# Patient Record
Sex: MALE | Race: WHITE | Employment: FULL TIME | URBAN - NONMETROPOLITAN AREA
[De-identification: names, ages, dates, MRNs, and addresses within clinical notes are randomized per-mention and may not be internally consistent; named-entity substitution may affect disease eponyms.]

---

## 2023-10-31 ENCOUNTER — OFFICE VISIT (OUTPATIENT)
Dept: FAMILY MEDICINE CLINIC | Age: 33
End: 2023-10-31
Payer: COMMERCIAL

## 2023-10-31 VITALS
OXYGEN SATURATION: 100 % | WEIGHT: 187 LBS | HEART RATE: 81 BPM | SYSTOLIC BLOOD PRESSURE: 130 MMHG | BODY MASS INDEX: 26.77 KG/M2 | DIASTOLIC BLOOD PRESSURE: 84 MMHG | HEIGHT: 70 IN

## 2023-10-31 DIAGNOSIS — Z00.00 ENCOUNTER FOR WELLNESS EXAMINATION IN ADULT: Primary | ICD-10-CM

## 2023-10-31 PROCEDURE — 99385 PREV VISIT NEW AGE 18-39: CPT | Performed by: NURSE PRACTITIONER

## 2023-10-31 SDOH — ECONOMIC STABILITY: HOUSING INSECURITY
IN THE LAST 12 MONTHS, WAS THERE A TIME WHEN YOU DID NOT HAVE A STEADY PLACE TO SLEEP OR SLEPT IN A SHELTER (INCLUDING NOW)?: NO

## 2023-10-31 SDOH — ECONOMIC STABILITY: INCOME INSECURITY: HOW HARD IS IT FOR YOU TO PAY FOR THE VERY BASICS LIKE FOOD, HOUSING, MEDICAL CARE, AND HEATING?: NOT HARD AT ALL

## 2023-10-31 SDOH — HEALTH STABILITY: PHYSICAL HEALTH: ON AVERAGE, HOW MANY DAYS PER WEEK DO YOU ENGAGE IN MODERATE TO STRENUOUS EXERCISE (LIKE A BRISK WALK)?: 3 DAYS

## 2023-10-31 SDOH — ECONOMIC STABILITY: FOOD INSECURITY: WITHIN THE PAST 12 MONTHS, YOU WORRIED THAT YOUR FOOD WOULD RUN OUT BEFORE YOU GOT MONEY TO BUY MORE.: NEVER TRUE

## 2023-10-31 SDOH — ECONOMIC STABILITY: FOOD INSECURITY: WITHIN THE PAST 12 MONTHS, THE FOOD YOU BOUGHT JUST DIDN'T LAST AND YOU DIDN'T HAVE MONEY TO GET MORE.: NEVER TRUE

## 2023-10-31 SDOH — HEALTH STABILITY: PHYSICAL HEALTH: ON AVERAGE, HOW MANY MINUTES DO YOU ENGAGE IN EXERCISE AT THIS LEVEL?: 30 MIN

## 2023-10-31 ASSESSMENT — PATIENT HEALTH QUESTIONNAIRE - PHQ9
SUM OF ALL RESPONSES TO PHQ QUESTIONS 1-9: 0
2. FEELING DOWN, DEPRESSED OR HOPELESS: 0
SUM OF ALL RESPONSES TO PHQ QUESTIONS 1-9: 0
SUM OF ALL RESPONSES TO PHQ QUESTIONS 1-9: 0
1. LITTLE INTEREST OR PLEASURE IN DOING THINGS: 0
SUM OF ALL RESPONSES TO PHQ QUESTIONS 1-9: 0
SUM OF ALL RESPONSES TO PHQ9 QUESTIONS 1 & 2: 0

## 2023-10-31 NOTE — PROGRESS NOTES
Subjective  Ava Wheeler, 35 y.o. male presents today with:  Chief Complaint   Patient presents with    Establish Care     Following from [facility] was seeing Dr. Bing Gibson. No concerns        I reviewed staff HPI/chief complaint and do agree with above    Patient presents today in office to establish care    History: Hair loss, patient currently taking finasteride and minoxidil for condition. Any previous diagnosis: Denies any chronic medical past history  History of seeing any specialist(s): Denies  When was your last doctor visit: Approximately 1 year ago  Last provider: Dr. Bing Gibson  Any recent labs: no    Health Maintenence UTD:  Colonoscopy: Denies ever having completed, denies any family history of colorectal cancer  PSA: Denies having completed, states previous PCP did want to start laboratory work around age 28 due to family history of prostate cancer    Acute:   Current issues/complaints: Denies any acute symptoms or concerns in office today          Review of Systems   Constitutional:  Negative for chills, fatigue and fever. HENT:  Negative for congestion, ear pain, postnasal drip, rhinorrhea, sinus pressure, sinus pain, sore throat and trouble swallowing. Eyes:  Negative for photophobia, pain, redness and visual disturbance. Respiratory:  Negative for cough, chest tightness and shortness of breath. Cardiovascular:  Negative for chest pain, palpitations and leg swelling. Gastrointestinal:  Negative for abdominal pain, blood in stool, constipation, diarrhea, nausea and vomiting. Endocrine: Negative for cold intolerance and heat intolerance. Genitourinary:  Negative for difficulty urinating, dysuria, frequency, hematuria and urgency. Musculoskeletal:  Negative for arthralgias, joint swelling and myalgias. Skin:  Negative for rash and wound. Neurological:  Negative for dizziness, weakness, light-headedness, numbness and headaches.    Psychiatric/Behavioral:  Negative for behavioral problems

## 2023-11-05 ASSESSMENT — ENCOUNTER SYMPTOMS
BLOOD IN STOOL: 0
EYE PAIN: 0
SINUS PRESSURE: 0
COUGH: 0
NAUSEA: 0
SINUS PAIN: 0
SORE THROAT: 0
SHORTNESS OF BREATH: 0
ABDOMINAL PAIN: 0
TROUBLE SWALLOWING: 0
VOMITING: 0
PHOTOPHOBIA: 0
DIARRHEA: 0
RHINORRHEA: 0
CONSTIPATION: 0
CHEST TIGHTNESS: 0
EYE REDNESS: 0

## 2024-07-25 ENCOUNTER — OFFICE VISIT (OUTPATIENT)
Dept: FAMILY MEDICINE CLINIC | Age: 34
End: 2024-07-25
Payer: COMMERCIAL

## 2024-07-25 VITALS
HEIGHT: 70 IN | HEART RATE: 81 BPM | OXYGEN SATURATION: 98 % | BODY MASS INDEX: 24.62 KG/M2 | WEIGHT: 172 LBS | DIASTOLIC BLOOD PRESSURE: 86 MMHG | SYSTOLIC BLOOD PRESSURE: 118 MMHG

## 2024-07-25 DIAGNOSIS — M54.32 SCIATICA OF LEFT SIDE: Primary | ICD-10-CM

## 2024-07-25 PROCEDURE — 99213 OFFICE O/P EST LOW 20 MIN: CPT | Performed by: NURSE PRACTITIONER

## 2024-07-25 RX ORDER — TIZANIDINE 4 MG/1
4 TABLET ORAL 3 TIMES DAILY PRN
Qty: 30 TABLET | Refills: 0 | Status: SHIPPED | OUTPATIENT
Start: 2024-07-25

## 2024-07-25 RX ORDER — METHYLPREDNISOLONE 4 MG/1
TABLET ORAL
Qty: 1 KIT | Refills: 0 | Status: SHIPPED | OUTPATIENT
Start: 2024-07-25

## 2024-07-25 ASSESSMENT — PATIENT HEALTH QUESTIONNAIRE - PHQ9
SUM OF ALL RESPONSES TO PHQ9 QUESTIONS 1 & 2: 0
SUM OF ALL RESPONSES TO PHQ QUESTIONS 1-9: 0
SUM OF ALL RESPONSES TO PHQ QUESTIONS 1-9: 0
1. LITTLE INTEREST OR PLEASURE IN DOING THINGS: NOT AT ALL
SUM OF ALL RESPONSES TO PHQ QUESTIONS 1-9: 0
SUM OF ALL RESPONSES TO PHQ QUESTIONS 1-9: 0

## 2024-07-25 NOTE — PROGRESS NOTES
Date of Visit:  2024  Patient Name: Johnnie Hurst   Patient :  1990     CHIEF COMPLAINT:     Johnnie Hurst is a 33 y.o. male who presents today for an general visit to be evaluated for the following condition(s):  Chief Complaint   Patient presents with    Back Pain     Symptoms began- 2 weeks ago   Injury? N  Fall? N   Location- LT leg   Rates- 8/10 when walking   History of back issues- N  Numbness/ tingling- Y   Weakness-N   Loss bowel/bladder- N   Treatments- Ibuprofen        HISTORY OF PRESENT ILLNESS     I reviewed staff HPI/chief complaint and do agree with above    CHIEF COMPLAINT: Lower back pain which has not improved, but is having pain in the Left buttock region radiating down the left posterior leg.     PRECIPITATING EVENT: Denies any recent injuries or trauma to the area    DURATION OF SYMPTOMS: X 2 weeks    Pain Radiation: Down the posterior left leg region    Aggravating Factors: Certain positions with lumbar flexion/extension Lifting, Pushing, Pulling, Walking, Walking upstairs, Walking downstairs, Entering/exiting a car    Alleviating Factors: Again he can get in certain positions to improve the pain, has been using ibuprofen which did help with the original lumbar back pain    Pain Ratio: Pain today noted mostly to the left buttocks radiating down the left posterior leg    AMBULATORY STATUS: Independent Community Distances  ANTIPLATELET OR ANTICOAGULATION STATUS: No  PREVIOUS IMAGING: No previous imaging  PREVIOUS PHYSICAL THERAPY/CHIROPRACTOR: No physical therapy or chiropractic services, states he does have a friend who is a physical therapist and has been doing lumbar back stretches that were discussed with this individual which has helped with the lower back pain.  PREVIOUS CONSERVATIVE TREATMENTS: Ibuprofen and stretching  RED FLAGS: Denies any loss of bowel or bladder, saddle paresthesia, unintentional weight loss, fevers, night sweats/chills, changes in appetite, changes

## 2024-07-26 ENCOUNTER — TELEPHONE (OUTPATIENT)
Dept: FAMILY MEDICINE CLINIC | Age: 34
End: 2024-07-26

## 2024-07-26 NOTE — TELEPHONE ENCOUNTER
Called patient and discussed symptoms and continue treatment plan.  Please notify provider if any worsening symptoms noted.

## 2024-07-26 NOTE — TELEPHONE ENCOUNTER
Johnnie woke up today in much more pain than normal. He can still find a comfortable position, but is unable to walk upright and visibly more uncomfortable. We are trusting the process but we were wondering if this is normal and a “worse before getting better” situation. I know you probably can’t respond to me and we have been trying to log in to Johnnie’s My Chart with no luck. If someone could call Johnnie at [phone] if necessary, that would be so helpful!

## 2024-07-30 ASSESSMENT — ENCOUNTER SYMPTOMS
ABDOMINAL PAIN: 0
BACK PAIN: 1
CONSTIPATION: 0
VOMITING: 0
CHEST TIGHTNESS: 0
COLOR CHANGE: 0
SHORTNESS OF BREATH: 0
NAUSEA: 0
DIARRHEA: 0

## 2024-07-31 DIAGNOSIS — M54.30 SCIATICA, UNSPECIFIED LATERALITY: Primary | ICD-10-CM

## 2024-07-31 RX ORDER — GABAPENTIN 300 MG/1
300 CAPSULE ORAL 2 TIMES DAILY
Qty: 30 CAPSULE | Refills: 0 | Status: SHIPPED | OUTPATIENT
Start: 2024-07-31 | End: 2024-08-15

## 2024-08-01 ENCOUNTER — TELEPHONE (OUTPATIENT)
Dept: FAMILY MEDICINE CLINIC | Age: 34
End: 2024-08-01

## 2024-08-01 ENCOUNTER — HOSPITAL ENCOUNTER (OUTPATIENT)
Dept: MRI IMAGING | Age: 34
Discharge: HOME | End: 2024-08-03
Payer: COMMERCIAL

## 2024-08-01 DIAGNOSIS — M54.42 MIDLINE LOW BACK PAIN WITH LEFT-SIDED SCIATICA, UNSPECIFIED CHRONICITY: ICD-10-CM

## 2024-08-01 DIAGNOSIS — R29.898 LEFT LEG WEAKNESS: ICD-10-CM

## 2024-08-01 DIAGNOSIS — R29.898 LEFT LEG WEAKNESS: Primary | ICD-10-CM

## 2024-08-01 PROCEDURE — 72148 MRI LUMBAR SPINE W/O DYE: CPT

## 2024-08-02 ENCOUNTER — OFFICE VISIT (OUTPATIENT)
Dept: FAMILY MEDICINE CLINIC | Age: 34
End: 2024-08-02
Payer: COMMERCIAL

## 2024-08-02 VITALS
OXYGEN SATURATION: 98 % | TEMPERATURE: 98 F | HEIGHT: 70 IN | BODY MASS INDEX: 24.57 KG/M2 | SYSTOLIC BLOOD PRESSURE: 134 MMHG | HEART RATE: 97 BPM | DIASTOLIC BLOOD PRESSURE: 88 MMHG | WEIGHT: 171.6 LBS

## 2024-08-02 DIAGNOSIS — M51.37 DEGENERATIVE DISC DISEASE AT L5-S1 LEVEL: ICD-10-CM

## 2024-08-02 DIAGNOSIS — M48.062 SPINAL STENOSIS OF LUMBAR REGION WITH NEUROGENIC CLAUDICATION: Primary | ICD-10-CM

## 2024-08-02 DIAGNOSIS — M51.26 LUMBAR DISC HERNIATION: ICD-10-CM

## 2024-08-02 PROCEDURE — 99213 OFFICE O/P EST LOW 20 MIN: CPT | Performed by: NURSE PRACTITIONER

## 2024-08-02 RX ORDER — CYCLOBENZAPRINE HCL 10 MG
10 TABLET ORAL 3 TIMES DAILY PRN
COMMUNITY
Start: 2024-07-29

## 2024-08-02 ASSESSMENT — ENCOUNTER SYMPTOMS
NAUSEA: 0
COLOR CHANGE: 0
SHORTNESS OF BREATH: 0
DIARRHEA: 0
CONSTIPATION: 0
VOMITING: 0
CHEST TIGHTNESS: 0
ABDOMINAL PAIN: 0
BACK PAIN: 1

## 2024-08-02 NOTE — PROGRESS NOTES
Date of Visit:  2024  Patient Name: Johnnie Hurst   Patient :  1990     CHIEF COMPLAINT:     Johnnie Hurst is a 33 y.o. male who presents today for an general visit to be evaluated for the following condition(s):  Chief Complaint   Patient presents with    Back Pain     PT was seen at [facility] on 2024. PT had MRI completed and would like to go over results.        HISTORY OF PRESENT ILLNESS     I reviewed staff HPI/chief complaint and do agree with above    Patient presents for continued lower back pain/sciatica on left side, was seen on 2024 for this concern and was started on a course of prednisone and tizanidine he did return to work the next day and the symptoms did significantly worsen with a cramping type pain in the back and severe pain rating down his left leg.  Due to this he presented to the ED at [facility] on 2024 and did have x-ray completed that showed degenerative disc changes in the lower lumbar/sacral spine and was given a IM course of Toradol, and a prescription for Norco however was not effective for symptoms therefore did have gabapentin sent to pharmacy and [place] was stopped, also proceeded with an MRI at that time due to significant worsening in symptoms.  The MRI results are attached below, patient states that his pain is somewhat bearable on the gabapentin although does continue to have numbness down the posterior left leg into the foot.  Patient denies any loss of bowel or bladder, saddle paresthesia.    Impression  1. Moderate left subarticular L5-S1 disc herniation compresses the left S1  nerve root in the lateral recess. Moderate central and left subarticular zone  disc bulging as well, without spinal stenosis.  2. 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          REVIEW OF SYSTEM      Review of Systems   Constitutional:  Negative for chills, diaphoresis, fatigue, fever and unexpected weight change.   Respiratory:  Negative for chest 
n/a

## 2024-08-05 ENCOUNTER — HOSPITAL ENCOUNTER (OUTPATIENT)
Dept: PHYSICAL THERAPY | Age: 34
Setting detail: THERAPIES SERIES
Discharge: HOME | End: 2024-08-05
Payer: COMMERCIAL

## 2024-08-05 PROCEDURE — 97161 PT EVAL LOW COMPLEX 20 MIN: CPT

## 2024-08-05 PROCEDURE — 97012 MECHANICAL TRACTION THERAPY: CPT

## 2024-08-05 NOTE — PROGRESS NOTES
Phone: [phone]                       [facility]         Fax: [phone]                      Outpatient Physical Therapy                                                                      Evaluation    Date: 2024  Patient: Johnnie Hurst  : 1990  ACCT #: [id number]    Referring Provider (secondary): Leo Santos APRN - CNP    Diagnosis: Spinal stenosis of lumbar region with neurogenic claudication, Degenerative disc disease at L5-S1 level,  Lumbar disc herniation    Treatment Diagnosis: L LE pain with Lowback pain  Onset Date: 24  Total # of Visits Approved: 18 Per Physician Order  Total # of Visits to Date: 1  No Show: 0  Canceled Appointment: 0     Subjective     Additional Pertinent Hx: 24 pain started in posterior R LE with tightness in L posterior thigh,Pt completed press ups and Nerve glides with some centralization but no complete resolution, Pt was on steroid and muscle relaxer, flexed to reach into lower dresser drawer and pain dropped him to his knees 24. Pain was severe after.  Went to ER in [place] and was prescribed different muscle relaxer.  PCP prescribed additional steroid(last dose today 24) and pt is on Gabapentin.  Symptoms on gabapentin are less, more tingling/numb vs severe pain in L LE.  Lateral 2 toes are completely numb.  DTR Lpatellar 2-/2m Achillies 1+/2.  Roadkill press ups 10 x2 decreased tingling but numbness persists.  Completed L Sideglides 10x2 with slight change sensation - both issued for HEP.  Completed Lumbar traction static 60# x15min with improved mariaelena to amb post, and slight improvement with sensation.  MRI 24: L5-S1: Moderate left subarticular disc herniation with a moderate amount of   disc material extruded superiorly from the disc space, compressing the left   S1 nerve root in the lateral recess.  Moderate central and left subarticular   zone disc bulging as well.  No central spinal stenosis since the thecal sac

## 2024-08-05 NOTE — PLAN OF CARE
pain  Long Term Goal 4: Pt to report worst pain x 1 wk 2/10 to improve activity mariaelena.     Maricel Dean, PT   Date: 8/5/2024    ______________________________________ Date: 8/5/2024  Physician Signature  By signing above or cosigning electronically, I have reviewed this Plan of Care and certify a need for medically necessary rehabilitation services.

## 2024-08-08 ENCOUNTER — HOSPITAL ENCOUNTER (OUTPATIENT)
Dept: PHYSICAL THERAPY | Age: 34
Setting detail: THERAPIES SERIES
Discharge: HOME | End: 2024-08-08
Payer: COMMERCIAL

## 2024-08-08 PROCEDURE — 97110 THERAPEUTIC EXERCISES: CPT

## 2024-08-08 PROCEDURE — 97012 MECHANICAL TRACTION THERAPY: CPT

## 2024-08-08 ASSESSMENT — PAIN SCALES - GENERAL: PAINLEVEL_OUTOF10: 2

## 2024-08-08 NOTE — PROGRESS NOTES
Phone: [phone]                 [facility]      Fax: [phone]                            Outpatient Physical Therapy                                                                            Daily Note    Date: 2024  Patient Name: Johnnie Hurst        MRN: [id number]   ACCT#:  [id number]  : 1990  (33 y.o.)    Referring Provider (secondary): Leo Santos APRN - CNP         Diagnosis: Spinal stenosis of lumbar region with neurogenic claudication, Degenerative disc disease at L5-S1 level,  Lumbar disc herniation  Treatment Diagnosis: L LE pain with Lowback pain    Onset Date: 24  Total # of Visits Approved: 18 Per Physician Order  Total # of Visits to Date: 2  No Show: 0  Canceled Appointment: 0    Pre-Treatment Pain:  2/10     Assessment  Assessment: Pt reports since being off of steroid he has low back soreness. No c/o radicular symptoms. Initiated ex as outlined to promote postural strengthening. Traction increased to 65# for decompression with moist heat to promote increased blood flow. Will cont per plan.    Plan  Continue with current plan of care    Exercises/Modalities/Manual:  See DocFlow Sheet    Education: posture with new ex, heat          Goals  (Total # of Visits to Date: 2)   Short Term Goals  Time Frame for Short Term Goals: 6 visits  Short Term Goal 1: Pt to be independent and compliant with HEP for lumbar ROM/ Kathleen.    Long Term Goals  Time Frame for Long Term Goals : 18 visits  Long Term Goal 1: Pt improve Oswestry from 16/50 to <6/50 to improve ADL and work mariaelena.  Long Term Goal 2: Pt to report no radicular symptoms x3 consecutive days to improve activity mariaelena to household ADLsand work duties  Long Term Goal 3: Pt to complete 30# crate lift x 10 without increased back or LE pain  Long Term Goal 4: Pt to report worst pain x 1 wk 2/10 to improve activity mariaelena.    Post Treatment Pain:  2/10    Time In: 0730    Time Out : 0808        Timed Code Treatment Minutes:

## 2024-08-09 ENCOUNTER — HOSPITAL ENCOUNTER (OUTPATIENT)
Dept: PHYSICAL THERAPY | Age: 34
Setting detail: THERAPIES SERIES
Discharge: HOME | End: 2024-08-09
Payer: COMMERCIAL

## 2024-08-09 PROCEDURE — 97012 MECHANICAL TRACTION THERAPY: CPT

## 2024-08-09 PROCEDURE — 97110 THERAPEUTIC EXERCISES: CPT

## 2024-08-09 ASSESSMENT — PAIN SCALES - GENERAL: PAINLEVEL_OUTOF10: 2

## 2024-08-09 NOTE — PROGRESS NOTES
Phone: [phone]                 [facility]      Fax: [phone]                            Outpatient Physical Therapy                                                                            Daily Note    Date: 2024  Patient Name: Johnnie Hurst        MRN: [id number]   ACCT#:  [id number]  : 1990  (33 y.o.)    Referring Provider (secondary): Leo Santos APRN - CNP         Diagnosis: Spinal stenosis of lumbar region with neurogenic claudication, Degenerative disc disease at L5-S1 level,  Lumbar disc herniation  Treatment Diagnosis: L LE pain with Lowback pain    Onset Date: 24  Total # of Visits Approved: 18 Per Physician Order  Total # of Visits to Date: 3  No Show: 0  Canceled Appointment: 0    Pre-Treatment Pain:  2/10     Assessment  Assessment: Pt reports low back soreness persists at 2/10.  Sensation is almost 100% normal HS and calf, last 2 toe numbness persists.  Pt compliant with Roadkill press ups 8set per day.  Pt cued for staggered stance to reduce LB compression.  Leg rest for traction modified to gray platform with noted improved lateral 2 toe sensation following, also with increased pull this date to 70#.    Plan  Continue with current plan of care    Exercises/Modalities/Manual:  See DocFlow Sheet    Education: See assessment          Goals  (Total # of Visits to Date: 3)   Short Term Goals  Time Frame for Short Term Goals: 6 visits  Short Term Goal 1: Pt to be independent and compliant with HEP for lumbar ROM/ Kathleen.-MET    Long Term Goals  Time Frame for Long Term Goals : 18 visits  Long Term Goal 1: Pt improve Oswestry from 16/50 to <6/50 to improve ADL and work mariaelena.  Long Term Goal 2: Pt to report no radicular symptoms x3 consecutive days to improve activity mariaelena to household ADLsand work duties  Long Term Goal 3: Pt to complete 30# crate lift x 10 without increased back or LE pain  Long Term Goal 4: Pt to report worst pain x 1 wk 2/10 to improve activity

## 2024-08-12 ENCOUNTER — HOSPITAL ENCOUNTER (OUTPATIENT)
Dept: PHYSICAL THERAPY | Age: 34
Setting detail: THERAPIES SERIES
Discharge: HOME | End: 2024-08-12
Payer: COMMERCIAL

## 2024-08-12 PROCEDURE — 97012 MECHANICAL TRACTION THERAPY: CPT

## 2024-08-12 PROCEDURE — 97110 THERAPEUTIC EXERCISES: CPT

## 2024-08-12 ASSESSMENT — PAIN SCALES - GENERAL: PAINLEVEL_OUTOF10: 1

## 2024-08-12 NOTE — PROGRESS NOTES
Phone: [phone]                 [facility]      Fax: [phone]                            Outpatient Physical Therapy                                                                            Daily Note    Date: 2024  Patient Name: Johnnie Hurst        MRN: [id number]   ACCT#:  [id number]  : 1990  (34 y.o.)    Referring Provider (secondary): Leo Santos APRN - CNP         Diagnosis: Spinal stenosis of lumbar region with neurogenic claudication, Degenerative disc disease at L5-S1 level,  Lumbar disc herniation  Treatment Diagnosis: L LE pain with Lowback pain    Onset Date: 24  Total # of Visits Approved: 18 Per Physician Order  Total # of Visits to Date: 4  No Show: 0  Canceled Appointment: 0    Pre-Treatment Pain:  1/10     Assessment  Assessment: Pt reports continued improvement over the weekend. Pt was able to pitch whoofle ball without increased pain.  Pt reports sensation is almost normal posterior LE still some heel and lateral/5th met numbness and decreased sensation of Lateral 2 toes.  Pt reports post traction improved sensation of toes.  Pt also has regained motor control of lateral 2 toes.  Will cont to progress per pt mariaelena    Plan  Continue with current plan of care    Exercises/Modalities/Manual:  See DocFlow Sheet    Education: Access Code: [email or web address]  URL: [email or web address]  Date: 2024  Prepared by: Maricel Guerra-Adam    Exercises  - Bird Dog  - 2 x daily - 7 x weekly - 2 sets - 5 reps  - Standing High Row with Resistance  - 2 x daily - 7 x weekly - 2 sets - 10 reps  - Standing Anti-Rotation Press with Anchored Resistance  - 2 x daily - 7 x weekly - 1 sets - 10 reps - 5 hold  - Single Arm Shoulder Extension with Anchored Resistance  - 2 x daily - 7 x weekly - 2 sets - 10 reps          Goals  (Total # of Visits to Date: 4)   Short Term Goals  Time Frame for Short Term Goals: 6 visits  Short Term Goal 1: Pt to be independent and compliant

## 2024-08-14 ENCOUNTER — HOSPITAL ENCOUNTER (OUTPATIENT)
Dept: PHYSICAL THERAPY | Age: 34
Setting detail: THERAPIES SERIES
Discharge: HOME | End: 2024-08-14
Payer: COMMERCIAL

## 2024-08-14 PROCEDURE — 97012 MECHANICAL TRACTION THERAPY: CPT

## 2024-08-14 PROCEDURE — 97110 THERAPEUTIC EXERCISES: CPT

## 2024-08-14 NOTE — PROGRESS NOTES
Phone: [phone]                 [facility]      Fax: [phone]                            Outpatient Physical Therapy                                                                            Daily Note    Date: 2024  Patient Name: Johnnie Hurst        MRN: [id number]   ACCT#:  [id number]  : 1990  (34 y.o.)    Referring Provider (secondary): Leo Santos APRN - CNP         Diagnosis: Spinal stenosis of lumbar region with neurogenic claudication, Degenerative disc disease at L5-S1 level,  Lumbar disc herniation  Treatment Diagnosis: L LE pain with Lowback pain    Onset Date: 24  Total # of Visits Approved: 18 Per Physician Order  Total # of Visits to Date: 5  No Show: 0  Canceled Appointment: 0    Pre-Treatment Pain:  0/10  Subjective: 1645:  Patient is painfree currently, but still on Gabapentin.  Still has T/N into lateral toes.  Reports compliance with Rosamaria reps.  Assessment  Assessment: Good response to progression of Rosamaria extension.  Progressed to overpressure with reduction of peripheral symptoms.  Will transition from SG and roadkill to REIL with sag and see how he responds.  Held other progressions to assess response to this change.    Plan  Continue with current plan of care    Exercises/Modalities/Manual:  See DocFlow Sheet    Education: Education on progression of Rosamaria exercises          Goals  (Total # of Visits to Date: 5)   Short Term Goals  Time Frame for Short Term Goals: 6 visits  Short Term Goal 1: Pt to be independent and compliant with HEP for lumbar ROM/ Rosamaria.-MET    Long Term Goals  Time Frame for Long Term Goals : 18 visits  Long Term Goal 1: Pt improve Oswestry from 16/50 to <6/50 to improve ADL and work mariaelena.  Long Term Goal 2: Pt to report no radicular symptoms x3 consecutive days to improve activity mariaelena to household ADLsand work duties  Long Term Goal 3: Pt to complete 30# crate lift x 10 without increased back or LE pain  Long Term Goal

## 2024-08-16 ENCOUNTER — HOSPITAL ENCOUNTER (OUTPATIENT)
Dept: PHYSICAL THERAPY | Age: 34
Setting detail: THERAPIES SERIES
Discharge: HOME | End: 2024-08-16
Payer: COMMERCIAL

## 2024-08-16 PROCEDURE — 97012 MECHANICAL TRACTION THERAPY: CPT

## 2024-08-16 PROCEDURE — 97110 THERAPEUTIC EXERCISES: CPT

## 2024-08-16 NOTE — PROGRESS NOTES
Phone: [phone]                 [facility]      Fax: [phone]                            Outpatient Physical Therapy                                                                            Daily Note    Date: 2024  Patient Name: Johnnie Hurst        MRN: [id number]   ACCT#:  [id number]  : 1990  (34 y.o.)    Referring Provider (secondary): Leo Santos APRN - CNP         Diagnosis: Spinal stenosis of lumbar region with neurogenic claudication, Degenerative disc disease at L5-S1 level,  Lumbar disc herniation  Treatment Diagnosis: L LE pain with Lowback pain    Onset Date: 24  Total # of Visits Approved: 18 Per Physician Order  Total # of Visits to Date: 6  No Show: 0  Canceled Appointment: 0    Pre-Treatment Pain:  0/10  Subjective: 1645:  Patient is painfree currently, but still on Gabapentin.  Still has T/N into lateral toes.  Reports compliance with Kathleen reps.  Assessment  Assessment: Pt reports complete resolution of L LE symptoms after traction and REIL x4sets with over pressure.  No return of L LE symptoms after ther ex this date but still completed REIL 1 set with overpressure.  After prolonged standing while talking pt notes only very faint sensory change lateral foot but toes and heels remain normal.    Plan  Continue with current plan of care    Exercises/Modalities/Manual:  See DocFlow Sheet    Education: Proper body mechanics with lifting from floor          Goals  (Total # of Visits to Date: 6)   Short Term Goals  Time Frame for Short Term Goals: 6 visits  Short Term Goal 1: Pt to be independent and compliant with HEP for lumbar ROM/ Kathleen.-MET    Long Term Goals  Time Frame for Long Term Goals : 18 visits  Long Term Goal 1: Pt improve Oswestry from 16/50 to <6/50 to improve ADL and work mariaelena.  Long Term Goal 2: Pt to report no radicular symptoms x3 consecutive days to improve activity mariaelena to household ADLsand work duties  Long Term Goal 3: Pt to complete

## 2024-08-17 DIAGNOSIS — M54.30 SCIATICA, UNSPECIFIED LATERALITY: ICD-10-CM

## 2024-08-17 RX ORDER — GABAPENTIN 300 MG/1
300 CAPSULE ORAL 2 TIMES DAILY
Qty: 30 CAPSULE | Refills: 0 | Status: CANCELLED | OUTPATIENT
Start: 2024-08-17 | End: 2024-09-01

## 2024-08-18 DIAGNOSIS — M54.30 SCIATICA, UNSPECIFIED LATERALITY: ICD-10-CM

## 2024-08-19 ENCOUNTER — HOSPITAL ENCOUNTER (OUTPATIENT)
Dept: PHYSICAL THERAPY | Age: 34
Setting detail: THERAPIES SERIES
Discharge: HOME | End: 2024-08-19
Payer: COMMERCIAL

## 2024-08-19 PROCEDURE — 97012 MECHANICAL TRACTION THERAPY: CPT

## 2024-08-19 PROCEDURE — 97110 THERAPEUTIC EXERCISES: CPT

## 2024-08-19 NOTE — PROGRESS NOTES
Phone: [phone]                 [facility]      Fax: [phone]                            Outpatient Physical Therapy                                                                            Daily Note    Date: 2024  Patient Name: Johnnie Hurst        MRN: [id number]   ACCT#:  [id number]  : 1990  (34 y.o.)    Referring Provider (secondary): Leo Santos APRN - CNP         Diagnosis: Spinal stenosis of lumbar region with neurogenic claudication, Degenerative disc disease at L5-S1 level,  Lumbar disc herniation  Treatment Diagnosis: L LE pain with Lowback pain    Onset Date: 24  Total # of Visits Approved: 18 Per Physician Order  Total # of Visits to Date: 7  No Show: 0  Canceled Appointment: 0    Pre-Treatment Pain:  0/10     Assessment  Assessment: Pt reports complete resolution of symptoms in L LE and back this date.  Mild muscular soreness after last session.  Pt went to [facility] over the weekend and walked barefoot without pain/symptoms. Complete resolution of numbness through the weekend and throughtout today's session.  Will monitor pt mariaelena and need for additional visits.    Plan  Continue with current plan of care    Exercises/Modalities/Manual:  See DocFlow Sheet    Education: Continue with SAG pressup    Goals  (Total # of Visits to Date: 7)   Short Term Goals  Time Frame for Short Term Goals: 6 visits  Short Term Goal 1: Pt to be independent and compliant with HEP for lumbar ROM/ Kathleen.-MET    Long Term Goals  Time Frame for Long Term Goals : 18 visits  Long Term Goal 1: Pt improve Oswestry from 16/50 to <6/50 to improve ADL and work mariaelena.  Long Term Goal 2: Pt to report no radicular symptoms x3 consecutive days to improve activity mariaelena to household ADLsand work duties  Long Term Goal 3: Pt to complete 30# crate lift x 10 without increased back or LE pain  Long Term Goal 4: Pt to report worst pain x 1 wk 2/10 to improve activity mariaelena.    Post Treatment Pain:

## 2024-08-19 NOTE — TELEPHONE ENCOUNTER
Comments:     Last Office Visit (last PCP visit):   8/2/2024    Next Visit Date:  Future Appointments   Date Time Provider Department Center   8/19/2024 12:00 PM Maricel Dean, PT [facility] [place]   8/21/2024  3:45 PM Rakesh Low PT [facility] [place]   8/23/2024 12:00 PM Maricel Dean, PT [facility] [place]   11/8/2024  4:30 PM Leo Santos, APRN - CNP [facility]       **If hasn't been seen in over a year OR hasn't followed up according to last diabetes/ADHD visit, make appointment for patient before sending refill to provider.    Rx requested:  Requested Prescriptions     Pending Prescriptions Disp Refills    gabapentin (NEURONTIN) 300 MG capsule [Pharmacy Med Name: Gabapentin 300 MG Oral Capsule] 30 capsule 0     Sig: TAKE 1 CAPSULE BY MOUTH TWICE DAILY FOR 15 DAYS

## 2024-08-20 ENCOUNTER — TELEPHONE (OUTPATIENT)
Dept: FAMILY MEDICINE CLINIC | Age: 34
End: 2024-08-20

## 2024-08-20 DIAGNOSIS — M54.30 SCIATICA, UNSPECIFIED LATERALITY: ICD-10-CM

## 2024-08-20 RX ORDER — GABAPENTIN 300 MG/1
CAPSULE ORAL
Qty: 60 CAPSULE | Refills: 0 | Status: SHIPPED | OUTPATIENT
Start: 2024-08-20 | End: 2024-09-19

## 2024-08-20 RX ORDER — GABAPENTIN 300 MG/1
CAPSULE ORAL
Qty: 30 CAPSULE | Refills: 0 | OUTPATIENT
Start: 2024-08-20 | End: 2024-09-19

## 2024-08-20 NOTE — TELEPHONE ENCOUNTER
Patient has made the follow up for 10/21/2024-please refill the Gabapentin (refill sent from pharmacy)

## 2024-08-21 ENCOUNTER — HOSPITAL ENCOUNTER (OUTPATIENT)
Dept: PHYSICAL THERAPY | Age: 34
Setting detail: THERAPIES SERIES
Discharge: HOME | End: 2024-08-21
Payer: COMMERCIAL

## 2024-08-21 PROCEDURE — 97110 THERAPEUTIC EXERCISES: CPT

## 2024-08-21 PROCEDURE — 97012 MECHANICAL TRACTION THERAPY: CPT

## 2024-08-21 NOTE — PROGRESS NOTES
Phone: [phone]                 [facility]      Fax: [phone]                            Outpatient Physical Therapy                                                                            Daily Note    Date: 2024  Patient Name: Johnnie Hurst        MRN: [id number]   ACCT#:  [id number]  : 1990  (34 y.o.)    Referring Provider (secondary): Leo Santos APRN - CNP         Diagnosis: Spinal stenosis of lumbar region with neurogenic claudication, Degenerative disc disease at L5-S1 level,  Lumbar disc herniation  Treatment Diagnosis: L LE pain with Lowback pain    Onset Date: 24  Total # of Visits Approved: 18 Per Physician Order  Total # of Visits to Date: 8  No Show: 0  Canceled Appointment: 0    Pre-Treatment Pain:  0/10  Subjective: 1545:  Has been symptom free since .  Still alters how he bends and lifts.  Denies symptoms at work.  Assessment  Assessment: Did well with progression to KWAME.  To monitor symptoms and return to REIL if they return.  Education on starting modified plank to tolerance as well.  Discussion on return to golf at range with irons and progress to tolerance.    Plan  Continue with current plan of care    Exercises/Modalities/Manual:  See DocFlow Sheet    Education: Education on progression to KWAME, planks, return to golf progression          Goals  (Total # of Visits to Date: 8)   Short Term Goals  Time Frame for Short Term Goals: 6 visits  Short Term Goal 1: Pt to be independent and compliant with HEP for lumbar ROM/ Kathleen.-MET    Long Term Goals  Time Frame for Long Term Goals : 18 visits  Long Term Goal 1: Pt improve Oswestry from 16/50 to <6/50 to improve ADL and work mariaelena.  Long Term Goal 2: Pt to report no radicular symptoms x3 consecutive days to improve activity mariaelena to household ADLsand work duties  Long Term Goal 3: Pt to complete 30# crate lift x 10 without increased back or LE pain  Long Term Goal 4: Pt to report worst pain x 1 wk 2/10

## 2024-08-23 ENCOUNTER — HOSPITAL ENCOUNTER (OUTPATIENT)
Dept: PHYSICAL THERAPY | Age: 34
Setting detail: THERAPIES SERIES
Discharge: HOME | End: 2024-08-23
Payer: COMMERCIAL

## 2024-08-23 PROCEDURE — 97110 THERAPEUTIC EXERCISES: CPT

## 2024-08-23 NOTE — PROGRESS NOTES
Phone: [phone]                 [facility]      Fax: [phone]                            Outpatient Physical Therapy                                                                            Daily Note    Date: 2024  Patient Name: Johnnie Hurst        MRN: [id number]   ACCT#:  [id number]  : 1990  (34 y.o.)    Referring Provider (secondary): Leo Santos APRN - CNP         Diagnosis: Spinal stenosis of lumbar region with neurogenic claudication, Degenerative disc disease at L5-S1 level,  Lumbar disc herniation  Treatment Diagnosis: L LE pain with Lowback pain    Onset Date: 24  Total # of Visits Approved: 18 Per Physician Order  Total # of Visits to Date: 9  No Show: 0  Canceled Appointment: 0    Pre-Treatment Pain:  0/10  Subjective: 1545:  Has been symptom free since .  Still alters how he bends and lifts.  Denies symptoms at work.  Assessment  Assessment: Held traction this date.  No symptoms per pt.  Pt reports tightness or muscle \"pull\" near L SI which is long standing possibly even prior to onset..  Next 1-2 visits may evaluate SI mobility.  Fwd flex test negative.  B/L patellar reflexes WNL now.  Pt with good mariaelena to introduction of flexion of 10reps following extensions, completed pre and post exercise.  Pt without symptoms or concerns.  Pt instructed to complete fwd flex 10 reps 2x/daily and continue with KWAME.    Plan  Continue with current plan of care    Exercises/Modalities/Manual:  See DocFlow Sheet    Education: see assessment          Goals  (Total # of Visits to Date: 9)   Short Term Goals  Time Frame for Short Term Goals: 6 visits  Short Term Goal 1: Pt to be independent and compliant with HEP for lumbar ROM/ Kathleen.-MET    Long Term Goals  Time Frame for Long Term Goals : 18 visits  Long Term Goal 1: Pt improve Oswestry from 16/50 to <6/50 to improve ADL and work mariaelena.  Long Term Goal 2: Pt to report no radicular symptoms x3 consecutive days to improve

## 2024-08-26 ENCOUNTER — HOSPITAL ENCOUNTER (OUTPATIENT)
Dept: PHYSICAL THERAPY | Age: 34
Setting detail: THERAPIES SERIES
Discharge: HOME | End: 2024-08-26
Payer: COMMERCIAL

## 2024-08-26 ENCOUNTER — TELEPHONE (OUTPATIENT)
Dept: FAMILY MEDICINE CLINIC | Age: 34
End: 2024-08-26

## 2024-08-26 PROCEDURE — 97110 THERAPEUTIC EXERCISES: CPT

## 2024-08-26 NOTE — PROGRESS NOTES
Phone: [phone]                 [facility]      Fax: [phone]                            Outpatient Physical Therapy                                                                            Daily Note    Date: 2024  Patient Name: Johnnie Hurst        MRN: [id number]   ACCT#:  [id number]  : 1990  (34 y.o.)    Referring Provider (secondary): Leo Santos APRN - CNP         Diagnosis: Spinal stenosis of lumbar region with neurogenic claudication, Degenerative disc disease at L5-S1 level,  Lumbar disc herniation  Treatment Diagnosis: L LE pain with Lowback pain    Onset Date: 24  Total # of Visits Approved: 18 Per Physician Order  Total # of Visits to Date: 10  No Show: 0  Canceled Appointment: 0    Pre-Treatment Pain:  0/10     Assessment  Assessment: Pt reports pulling sensation resolved with completing repeated flexion 2x/day.  Pt has had no symptoms/pain other than muscle soreness.  Pt reports still on Gabapentin, pt to call and consult physician re: d/c of meds.  Will place pt on hold for 2 wks to ensure successful D/C to HEP.    Plan  Place pt on hold    Exercises/Modalities/Manual:  See DocFlow Sheet    Education: Consult physician re: weaning from Gabipentin          Goals  (Total # of Visits to Date: 10)   Short Term Goals  Time Frame for Short Term Goals: 6 visits  Short Term Goal 1: Pt to be independent and compliant with HEP for lumbar ROM/ Kathleen.-MET    Long Term Goals  Time Frame for Long Term Goals : 18 visits  Long Term Goal 1: Pt improve Oswestry from 16/50 to <6/50 to improve ADL and work mariaelena.-MET  Long Term Goal 2: Pt to report no radicular symptoms x3 consecutive days to improve activity mariaelena to household ADLsand work duties-MET  Long Term Goal 3: Pt to complete 30# crate lift x 10 without increased back or LE pain-MET  Long Term Goal 4: Pt to report worst pain x 1 wk 2/10 to improve activity mariaelena.-MET    Post Treatment Pain:  0/10    Time In: 1218  Time Out: 
25

## 2024-08-26 NOTE — TELEPHONE ENCOUNTER
Pt was put on Gabapentin for a back pain. He has completed PT wants to know how to wean off this medication.       [phone]

## 2024-08-27 NOTE — TELEPHONE ENCOUNTER
Can back down to 1 tab per day over the next week and then 1 tab every other day for a week and then discontinue completely.

## 2024-08-28 ENCOUNTER — APPOINTMENT (OUTPATIENT)
Dept: PHYSICAL THERAPY | Age: 34
End: 2024-08-28
Payer: COMMERCIAL

## 2024-08-30 ENCOUNTER — APPOINTMENT (OUTPATIENT)
Dept: PHYSICAL THERAPY | Age: 34
End: 2024-08-30
Payer: COMMERCIAL

## 2024-11-01 ENCOUNTER — OFFICE VISIT (OUTPATIENT)
Dept: FAMILY MEDICINE CLINIC | Age: 34
End: 2024-11-01
Payer: COMMERCIAL

## 2024-11-01 VITALS
SYSTOLIC BLOOD PRESSURE: 134 MMHG | HEART RATE: 89 BPM | WEIGHT: 185.2 LBS | BODY MASS INDEX: 26.51 KG/M2 | DIASTOLIC BLOOD PRESSURE: 82 MMHG | OXYGEN SATURATION: 98 % | HEIGHT: 70 IN

## 2024-11-01 DIAGNOSIS — Z00.00 PREVENTATIVE HEALTH CARE: ICD-10-CM

## 2024-11-01 DIAGNOSIS — Z00.00 ADULT WELLNESS VISIT: Primary | ICD-10-CM

## 2024-11-01 PROCEDURE — 99395 PREV VISIT EST AGE 18-39: CPT | Performed by: NURSE PRACTITIONER

## 2024-11-01 SDOH — ECONOMIC STABILITY: FOOD INSECURITY: WITHIN THE PAST 12 MONTHS, THE FOOD YOU BOUGHT JUST DIDN'T LAST AND YOU DIDN'T HAVE MONEY TO GET MORE.: NEVER TRUE

## 2024-11-01 SDOH — ECONOMIC STABILITY: FOOD INSECURITY: WITHIN THE PAST 12 MONTHS, YOU WORRIED THAT YOUR FOOD WOULD RUN OUT BEFORE YOU GOT MONEY TO BUY MORE.: NEVER TRUE

## 2024-11-01 SDOH — ECONOMIC STABILITY: INCOME INSECURITY: HOW HARD IS IT FOR YOU TO PAY FOR THE VERY BASICS LIKE FOOD, HOUSING, MEDICAL CARE, AND HEATING?: NOT HARD AT ALL

## 2024-11-01 NOTE — PROGRESS NOTES
Date of Visit:  11/10/2024  Patient Name: Johnnie Hurst   Patient :  1990     CHIEF COMPLAINT:     Johnnie Hurst is a 34 y.o. male who presents today for an general visit to be evaluated for the following condition(s):  Chief Complaint   Patient presents with    Annual Exam     Well ness visit. Pt would like to f/u on back pain. He completed physical therapy. He denies any pain but does have stretching exercises he does at home to help.     Health Maintenance     Declined        HISTORY OF PRESENT ILLNESS     I reviewed staff HPI/chief complaint and do agree with above    Patient presents today for well adult exam.  No complaints or concerns in office today.  Patient did recently have issues with degenerative disc disease of lumbar with disc herniation and was following with physical therapy as well as being treated with gabapentin and NSAIDs.  Today in office patient states he has completed with physical therapy and has titrated off the gabapentin and NSAID use and symptoms have resolved.  Does need to have laboratory work completed.  Does have history of infrequent chewing tobacco use in the past but has not used in a prolonged period of time.  No history of tobacco smoking.        REVIEW OF SYSTEM      Review of Systems   Constitutional:  Negative for chills, fatigue and fever.   HENT:  Negative for congestion, ear pain, postnasal drip, rhinorrhea, sinus pressure, sinus pain, sore throat and trouble swallowing.    Eyes:  Negative for photophobia, pain, redness and visual disturbance.   Respiratory:  Negative for cough, chest tightness and shortness of breath.    Cardiovascular:  Negative for chest pain, palpitations and leg swelling.   Gastrointestinal:  Negative for abdominal pain, blood in stool, constipation, diarrhea, nausea and vomiting.   Endocrine: Negative for cold intolerance and heat intolerance.   Genitourinary:  Negative for difficulty urinating, dysuria, frequency, hematuria and 
 Extraocular Movements: Extraocular movements intact.      Conjunctiva/sclera: Conjunctivae normal.      Pupils: Pupils are equal, round, and reactive to light.   Neck:      Vascular: No carotid bruit.   Cardiovascular:      Rate and Rhythm: Normal rate and regular rhythm.      Heart sounds: Normal heart sounds. No murmur heard.  Pulmonary:      Effort: Pulmonary effort is normal. No respiratory distress.      Breath sounds: Normal breath sounds. No wheezing, rhonchi or rales.   Musculoskeletal:         General: No swelling. Normal range of motion.      Cervical back: Normal range of motion and neck supple.      Right lower leg: No edema.      Left lower leg: No edema.   Lymphadenopathy:      Cervical: No cervical adenopathy.   Skin:     General: Skin is warm and dry.      Capillary Refill: Capillary refill takes less than 2 seconds.   Neurological:      General: No focal deficit present.      Mental Status: He is alert and oriented to person, place, and time.      Motor: No weakness.   Psychiatric:         Mood and Affect: Mood normal.         Behavior: Behavior normal.       ASSESSMENT/PLAN     1. Adult wellness visit    2. Preventative health care  ***  - CBC with Auto Differential; Future  - Lipid Panel; Future  - Basic Metabolic Panel; Future        COMMUNICATION:       Electronically signed by MOSES Gamez CNP, MD on 11/5/2024 10:08 PM

## 2024-11-05 ASSESSMENT — ENCOUNTER SYMPTOMS
SHORTNESS OF BREATH: 0
EYE REDNESS: 0
EYE PAIN: 0
BLOOD IN STOOL: 0
DIARRHEA: 0
PHOTOPHOBIA: 0
SINUS PRESSURE: 0
TROUBLE SWALLOWING: 0
SINUS PAIN: 0
NAUSEA: 0
ABDOMINAL PAIN: 0
VOMITING: 0
CONSTIPATION: 0
RHINORRHEA: 0
COUGH: 0
CHEST TIGHTNESS: 0
SORE THROAT: 0